# Patient Record
Sex: MALE | Race: WHITE
[De-identification: names, ages, dates, MRNs, and addresses within clinical notes are randomized per-mention and may not be internally consistent; named-entity substitution may affect disease eponyms.]

---

## 2022-09-03 ENCOUNTER — HOSPITAL ENCOUNTER (EMERGENCY)
Dept: HOSPITAL 54 - ER | Age: 47
Discharge: HOME | End: 2022-09-03
Payer: SELF-PAY

## 2022-09-03 VITALS — HEIGHT: 66 IN | BODY MASS INDEX: 20.89 KG/M2 | WEIGHT: 130 LBS

## 2022-09-03 VITALS — DIASTOLIC BLOOD PRESSURE: 87 MMHG | SYSTOLIC BLOOD PRESSURE: 138 MMHG

## 2022-09-03 DIAGNOSIS — K62.89: ICD-10-CM

## 2022-09-03 DIAGNOSIS — Z60.2: ICD-10-CM

## 2022-09-03 DIAGNOSIS — G89.18: Primary | ICD-10-CM

## 2022-09-03 SDOH — SOCIAL STABILITY - SOCIAL INSECURITY: PROBLEMS RELATED TO LIVING ALONE: Z60.2

## 2022-09-03 NOTE — NUR
BIBS C/O RECTAL PAIN S/P HEMORRHOID REMOVAL THURSDAY. PATIENT ALERT AND 
ORIENTED X3. AMBULATORY WITH NON LABORED BREATHING IN BED 04 IN GOWN AWAITING 
MD MARIE.